# Patient Record
Sex: FEMALE | Race: BLACK OR AFRICAN AMERICAN | NOT HISPANIC OR LATINO | Employment: FULL TIME | URBAN - METROPOLITAN AREA
[De-identification: names, ages, dates, MRNs, and addresses within clinical notes are randomized per-mention and may not be internally consistent; named-entity substitution may affect disease eponyms.]

---

## 2018-03-20 ENCOUNTER — OFFICE VISIT (OUTPATIENT)
Dept: URGENT CARE | Facility: CLINIC | Age: 33
End: 2018-03-20
Payer: MEDICAID

## 2018-03-20 VITALS
HEIGHT: 59 IN | TEMPERATURE: 98 F | DIASTOLIC BLOOD PRESSURE: 75 MMHG | WEIGHT: 170 LBS | HEART RATE: 80 BPM | BODY MASS INDEX: 34.27 KG/M2 | SYSTOLIC BLOOD PRESSURE: 122 MMHG | OXYGEN SATURATION: 97 %

## 2018-03-20 DIAGNOSIS — S16.1XXA NECK STRAIN, INITIAL ENCOUNTER: Primary | ICD-10-CM

## 2018-03-20 PROCEDURE — 99203 OFFICE O/P NEW LOW 30 MIN: CPT | Mod: S$GLB,,, | Performed by: FAMILY MEDICINE

## 2018-03-20 RX ORDER — CYCLOBENZAPRINE HCL 10 MG
TABLET ORAL
Qty: 30 TABLET | Refills: 0 | Status: SHIPPED | OUTPATIENT
Start: 2018-03-20 | End: 2019-11-21

## 2018-03-20 RX ORDER — IBUPROFEN 600 MG/1
600 TABLET ORAL
Qty: 30 TABLET | Refills: 1 | Status: SHIPPED | OUTPATIENT
Start: 2018-03-20 | End: 2018-03-30

## 2018-03-20 RX ORDER — KETOROLAC TROMETHAMINE 30 MG/ML
30 INJECTION, SOLUTION INTRAMUSCULAR; INTRAVENOUS
Status: COMPLETED | OUTPATIENT
Start: 2018-03-20 | End: 2018-03-20

## 2018-03-20 RX ADMIN — KETOROLAC TROMETHAMINE 30 MG: 30 INJECTION, SOLUTION INTRAMUSCULAR; INTRAVENOUS at 03:03

## 2018-03-20 NOTE — PATIENT INSTRUCTIONS
Understanding Cervical Strain    There are 7 bones (vertebrae) in the neck that are part of the spine. These are called the cervical spine. Cervical strain is a medical term for neck pain. The neck has several layers of muscles. These are connected with tendons to the cervical spine and other bones. Neck pain is often the result of injury to these muscles and tendons.  Causes of cervical strain  Different types of stress on the neck can damage muscles and tendons (soft tissues) and cause cervical strain. Cervical tissues can be damaged by:  · The neck being forced past its normal range of motion, such as in a car accident or sports injury  · Constant, low-level stress, such as from poor posture or a poorly set-up workspace  Symptoms of cervical strain  These may include:  · Neck pain or stiffness  · Pain in the shoulders or upper back  · Muscle spasms  · Headache, often starting at the base of the neck  · Irritability, difficulty concentrating, or sleeplessness  Treatment for cervical strain  This problem often gets better on its own. Treatments aim to reduce pain and inflammation and increase the range of motion of the neck. Possible treatments include:  · Over-the-counter or prescription pain medicine. These help relieve pain and inflammation.  · Stretching exercises to decrease neck stiffness.  · Massage to decrease neck stiffness.  · Cold or heat pack. These help reduce pain and swelling.  Call 911  Call emergency services right away if you have any of these:  · Face drooping or numbness  · Numbness or weakness, especially in the arms or on one side  · Slurred speech or difficulty speaking  · Blurred vision   When to call your healthcare provider  Call your healthcare provider right away if you have any of these:  · Fever of 100.4°F (38°C) or higher, or as directed  · Pain or stiffness that gets worse  · Symptoms that dont get better, or get worse  · Numbness, tingling, weakness or shooting pains into the  arms or legs  · New symptoms  Date Last Reviewed: 3/10/2016  © 6735-6142 [employer]. [address] [place] [address], [place] [address]. All rights reserved. This information is not intended as a substitute for professional medical care. Always follow your healthcare professional's instructions.      DON'T BEGIN THE IBUPROFEN UNTIL TOMORROW.    BE AWARE THAT THE FLEXERIL CAN MAKE YOU SLEEPY SO DON'T TAKE BEFORE WORK OR DRIVING.    Make sure that you follow up with your primary care doctor in the next 2-5 days if needed .  Return to the Urgent Care if signs or symptoms change and certainly if you have worsening symptoms go to the nearest emergency department for further evaluation.

## 2018-03-20 NOTE — PROGRESS NOTES
"Subjective:       Patient ID: Saritha Wilkes is a 32 y.o. female.    Vitals:  height is 4' 11" (1.499 m) and weight is 77.1 kg (170 lb). Her oral temperature is 98.3 °F (36.8 °C). Her blood pressure is 122/75 and her pulse is 80. Her oxygen saturation is 97%.     Chief Complaint: Neck Pain    Woke up Sunday with a sore neck. It was better Sun and Mon during the day, but woke up this morning with more pain. No hx of trauma. No fever or rash.      Neck Pain    This is a new problem. Episode onset: 2 days. The problem occurs constantly. The problem has been gradually worsening. The pain is associated with nothing. The pain is present in the left side. The quality of the pain is described as aching. The pain is at a severity of 10/10. The pain is severe. The symptoms are aggravated by bending and swallowing. Stiffness is present in the morning. Associated symptoms include headaches and trouble swallowing. Pertinent negatives include no chest pain, fever, leg pain, tingling or weakness. She has tried heat for the symptoms. The treatment provided no relief.     Review of Systems   Constitution: Negative for chills, fever and weakness.   HENT: Positive for trouble swallowing. Negative for sore throat.    Eyes: Negative for blurred vision.   Cardiovascular: Negative for chest pain.   Respiratory: Negative for shortness of breath.    Skin: Negative for rash.   Musculoskeletal: Positive for neck pain. Negative for back pain and joint pain.   Gastrointestinal: Negative for abdominal pain, diarrhea, nausea and vomiting.   Neurological: Positive for headaches. Negative for tingling.   Psychiatric/Behavioral: The patient is not nervous/anxious.        Objective:      Physical Exam   Constitutional: She is oriented to person, place, and time. She appears well-developed and well-nourished. She is cooperative.  Non-toxic appearance. She does not appear ill. No distress.   HENT:   Head: Normocephalic and atraumatic.   Right Ear: " Hearing, tympanic membrane, external ear and ear canal normal.   Left Ear: Hearing, tympanic membrane, external ear and ear canal normal.   Nose: Nose normal. No mucosal edema, rhinorrhea or nasal deformity. No epistaxis. Right sinus exhibits no maxillary sinus tenderness and no frontal sinus tenderness. Left sinus exhibits no maxillary sinus tenderness and no frontal sinus tenderness.   Mouth/Throat: Uvula is midline, oropharynx is clear and moist and mucous membranes are normal. No trismus in the jaw. Normal dentition. No uvula swelling. No posterior oropharyngeal erythema.   Eyes: Conjunctivae and lids are normal. Right eye exhibits no discharge. Left eye exhibits no discharge. No scleral icterus.   Sclera clear bilat   Neck: Trachea normal, normal range of motion, full passive range of motion without pain and phonation normal. Neck supple.   No tenderness with palpation of cervical spine.  Limited ROM in all lung fields due to pain.  Left paracervical mm tenderness and into trap area   Cardiovascular: Normal rate, regular rhythm, normal heart sounds, intact distal pulses and normal pulses.    Pulmonary/Chest: Effort normal and breath sounds normal. No respiratory distress. She has no wheezes.   Abdominal: Soft. Normal appearance and bowel sounds are normal. She exhibits no distension, no pulsatile midline mass and no mass. There is no tenderness. There is no guarding.   Musculoskeletal: Normal range of motion. She exhibits no edema or deformity.   Neurological: She is alert and oriented to person, place, and time. She exhibits normal muscle tone. Coordination normal.   Skin: Skin is warm, dry and intact. She is not diaphoretic. No pallor.   Psychiatric: She has a normal mood and affect. Her speech is normal and behavior is normal. Judgment and thought content normal. Cognition and memory are normal.   Nursing note and vitals reviewed.      Assessment:       1. Neck strain, initial encounter        Plan:          Neck strain, initial encounter  -     ketorolac injection 30 mg; Inject 1 mL (30 mg total) into the muscle one time.  -     ibuprofen (ADVIL,MOTRIN) 600 MG tablet; Take 1 tablet (600 mg total) by mouth 3 (three) times daily with meals.  Dispense: 30 tablet; Refill: 1  -     cyclobenzaprine (FLEXERIL) 10 MG tablet; 1/2- 1 tab  3 times daily as needed  Dispense: 30 tablet; Refill: 0    DON'T BEGIN THE IBUPROFEN UNTIL TOMORROW.    BE AWARE THAT THE FLEXERIL CAN MAKE YOU SLEEPY SO DON'T TAKE BEFORE WORK OR DRIVING.    Make sure that you follow up with your primary care doctor in the next 2-5 days if needed .  Return to the Urgent Care if signs or symptoms change and certainly if you have worsening symptoms go to the nearest emergency department for further evaluation.

## 2019-11-21 PROBLEM — K21.9 GERD (GASTROESOPHAGEAL REFLUX DISEASE): Status: ACTIVE | Noted: 2019-11-21

## 2019-11-21 PROBLEM — F51.01 PRIMARY INSOMNIA: Status: ACTIVE | Noted: 2019-11-21

## 2020-03-04 ENCOUNTER — PATIENT OUTREACH (OUTPATIENT)
Dept: ADMINISTRATIVE | Facility: HOSPITAL | Age: 35
End: 2020-03-04

## 2020-05-21 ENCOUNTER — PATIENT OUTREACH (OUTPATIENT)
Dept: ADMINISTRATIVE | Facility: HOSPITAL | Age: 35
End: 2020-05-21

## 2021-05-06 ENCOUNTER — PATIENT MESSAGE (OUTPATIENT)
Dept: RESEARCH | Facility: HOSPITAL | Age: 36
End: 2021-05-06

## 2021-05-25 ENCOUNTER — OFFICE VISIT (OUTPATIENT)
Dept: URGENT CARE | Facility: CLINIC | Age: 36
End: 2021-05-25
Payer: MEDICAID

## 2021-05-25 VITALS
SYSTOLIC BLOOD PRESSURE: 125 MMHG | WEIGHT: 193 LBS | OXYGEN SATURATION: 99 % | BODY MASS INDEX: 38.91 KG/M2 | HEIGHT: 59 IN | HEART RATE: 87 BPM | RESPIRATION RATE: 16 BRPM | TEMPERATURE: 99 F | DIASTOLIC BLOOD PRESSURE: 75 MMHG

## 2021-05-25 DIAGNOSIS — J06.9 ACUTE URI: Primary | ICD-10-CM

## 2021-05-25 LAB
CTP QC/QA: YES
POC MOLECULAR INFLUENZA A AGN: NEGATIVE
POC MOLECULAR INFLUENZA B AGN: NEGATIVE

## 2021-05-25 PROCEDURE — 99214 PR OFFICE/OUTPT VISIT, EST, LEVL IV, 30-39 MIN: ICD-10-PCS | Mod: S$GLB,,, | Performed by: NURSE PRACTITIONER

## 2021-05-25 PROCEDURE — 87502 INFLUENZA DNA AMP PROBE: CPT | Mod: QW,S$GLB,, | Performed by: NURSE PRACTITIONER

## 2021-05-25 PROCEDURE — 87502 POCT INFLUENZA A/B MOLECULAR: ICD-10-PCS | Mod: QW,S$GLB,, | Performed by: NURSE PRACTITIONER

## 2021-05-25 PROCEDURE — 99214 OFFICE O/P EST MOD 30 MIN: CPT | Mod: S$GLB,,, | Performed by: NURSE PRACTITIONER

## 2021-05-25 RX ORDER — LORATADINE 10 MG/1
10 TABLET ORAL DAILY
Qty: 20 TABLET | Refills: 0 | Status: SHIPPED | OUTPATIENT
Start: 2021-05-25 | End: 2021-11-16

## 2021-05-25 RX ORDER — PREDNISONE 20 MG/1
20 TABLET ORAL DAILY
Qty: 4 TABLET | Refills: 0 | Status: SHIPPED | OUTPATIENT
Start: 2021-05-25 | End: 2021-05-29

## 2021-05-25 RX ORDER — FLUTICASONE PROPIONATE 50 MCG
1 SPRAY, SUSPENSION (ML) NASAL DAILY
Qty: 9.9 ML | Refills: 0 | Status: SHIPPED | OUTPATIENT
Start: 2021-05-25 | End: 2021-11-16

## 2021-08-24 ENCOUNTER — OFFICE VISIT (OUTPATIENT)
Dept: URGENT CARE | Facility: CLINIC | Age: 36
End: 2021-08-24
Payer: MEDICAID

## 2021-08-24 VITALS
SYSTOLIC BLOOD PRESSURE: 129 MMHG | WEIGHT: 189 LBS | OXYGEN SATURATION: 98 % | RESPIRATION RATE: 18 BRPM | DIASTOLIC BLOOD PRESSURE: 74 MMHG | HEIGHT: 59 IN | HEART RATE: 104 BPM | TEMPERATURE: 99 F | BODY MASS INDEX: 38.1 KG/M2

## 2021-08-24 DIAGNOSIS — Z11.59 SCREENING FOR VIRAL DISEASE: ICD-10-CM

## 2021-08-24 DIAGNOSIS — J06.9 VIRAL URI WITH COUGH: Primary | ICD-10-CM

## 2021-08-24 LAB
CTP QC/QA: YES
SARS-COV-2 RDRP RESP QL NAA+PROBE: NEGATIVE

## 2021-08-24 PROCEDURE — U0002: ICD-10-PCS | Mod: QW,S$GLB,, | Performed by: NURSE PRACTITIONER

## 2021-08-24 PROCEDURE — U0002 COVID-19 LAB TEST NON-CDC: HCPCS | Mod: QW,S$GLB,, | Performed by: NURSE PRACTITIONER

## 2021-08-24 PROCEDURE — 99203 PR OFFICE/OUTPT VISIT, NEW, LEVL III, 30-44 MIN: ICD-10-PCS | Mod: S$GLB,,, | Performed by: NURSE PRACTITIONER

## 2021-08-24 PROCEDURE — 99203 OFFICE O/P NEW LOW 30 MIN: CPT | Mod: S$GLB,,, | Performed by: NURSE PRACTITIONER

## 2021-08-24 RX ORDER — CETIRIZINE HYDROCHLORIDE 10 MG/1
10 TABLET ORAL DAILY
Qty: 10 TABLET | Refills: 0 | Status: SHIPPED | OUTPATIENT
Start: 2021-08-24 | End: 2021-09-03

## 2021-08-24 RX ORDER — FLUTICASONE PROPIONATE 50 MCG
1 SPRAY, SUSPENSION (ML) NASAL DAILY
Qty: 9.9 ML | Refills: 0 | Status: SHIPPED | OUTPATIENT
Start: 2021-08-24

## 2021-08-27 PROBLEM — H33.21 RIGHT RETINAL DETACHMENT: Status: ACTIVE | Noted: 2021-08-27

## 2021-11-16 PROBLEM — F51.01 PRIMARY INSOMNIA: Status: RESOLVED | Noted: 2019-11-21 | Resolved: 2021-11-16

## 2021-11-16 PROBLEM — H33.22 LEFT RETINAL DETACHMENT: Status: ACTIVE | Noted: 2021-11-16

## 2021-11-16 PROBLEM — H54.40 BLIND RIGHT EYE: Status: ACTIVE | Noted: 2021-11-16

## 2021-11-16 PROBLEM — H33.21 RIGHT RETINAL DETACHMENT: Status: ACTIVE | Noted: 2021-11-16

## 2021-12-23 ENCOUNTER — PATIENT MESSAGE (OUTPATIENT)
Dept: ADMINISTRATIVE | Facility: OTHER | Age: 36
End: 2021-12-23
Payer: MEDICAID

## 2022-12-21 PROBLEM — Z90.710 STATUS POST TOTAL HYSTERECTOMY: Status: ACTIVE | Noted: 2022-12-21

## 2022-12-27 RX ORDER — OXYCODONE HYDROCHLORIDE 5 MG/1
5 TABLET ORAL EVERY 4 HOURS PRN
Qty: 10 TABLET | Refills: 0 | Status: SHIPPED | OUTPATIENT
Start: 2022-12-27 | End: 2023-05-22